# Patient Record
Sex: FEMALE | Race: BLACK OR AFRICAN AMERICAN | NOT HISPANIC OR LATINO | Employment: FULL TIME | RURAL
[De-identification: names, ages, dates, MRNs, and addresses within clinical notes are randomized per-mention and may not be internally consistent; named-entity substitution may affect disease eponyms.]

---

## 2021-02-24 ENCOUNTER — OFFICE VISIT (OUTPATIENT)
Dept: FAMILY MEDICINE CLINIC | Facility: CLINIC | Age: 31
End: 2021-02-24

## 2021-02-24 VITALS
HEIGHT: 69 IN | HEART RATE: 92 BPM | RESPIRATION RATE: 20 BRPM | TEMPERATURE: 97.5 F | WEIGHT: 263.13 LBS | BODY MASS INDEX: 38.97 KG/M2 | OXYGEN SATURATION: 98 % | SYSTOLIC BLOOD PRESSURE: 140 MMHG | DIASTOLIC BLOOD PRESSURE: 94 MMHG

## 2021-02-24 DIAGNOSIS — R03.0 ELEVATED BLOOD PRESSURE READING: ICD-10-CM

## 2021-02-24 DIAGNOSIS — M79.644 FINGER PAIN, RIGHT: Primary | ICD-10-CM

## 2021-02-24 DIAGNOSIS — B07.9 VIRAL WART ON FINGER: ICD-10-CM

## 2021-02-24 PROCEDURE — 17110 DESTRUCTION B9 LES UP TO 14: CPT | Performed by: NURSE PRACTITIONER

## 2021-02-24 PROCEDURE — 99203 OFFICE O/P NEW LOW 30 MIN: CPT | Performed by: NURSE PRACTITIONER

## 2021-02-24 NOTE — PATIENT INSTRUCTIONS
Blood Pressure Record Sheet  To take your blood pressure, you will need a blood pressure machine. You can buy a blood pressure machine (blood pressure monitor) at your clinic, drug store, or online. When choosing one, consider:  · An automatic monitor that has an arm cuff.  · A cuff that wraps snugly around your upper arm. You should be able to fit only one finger between your arm and the cuff.  · A device that stores blood pressure reading results.  · Do not choose a monitor that measures your blood pressure from your wrist or finger.  Follow your health care provider's instructions for how to take your blood pressure. To use this form:  · Get one reading in the morning (a.m.) before you take any medicines.  · Get one reading in the evening (p.m.) before supper.  · Take at least 2 readings with each blood pressure check. This makes sure the results are correct. Wait 1-2 minutes between measurements.  · Write down the results in the spaces on this form.  · Repeat this once a week, or as told by your health care provider.  · Make a follow-up appointment with your health care provider to discuss the results.  Blood pressure log  Date: _______________________  · a.m. _____________________(1st reading) _____________________(2nd reading)  · p.m. _____________________(1st reading) _____________________(2nd reading)  Date: _______________________  · a.m. _____________________(1st reading) _____________________(2nd reading)  · p.m. _____________________(1st reading) _____________________(2nd reading)  Date: _______________________  · a.m. _____________________(1st reading) _____________________(2nd reading)  · p.m. _____________________(1st reading) _____________________(2nd reading)  Date: _______________________  · a.m. _____________________(1st reading) _____________________(2nd reading)  · p.m. _____________________(1st reading) _____________________(2nd reading)  Date: _______________________  · a.m.  _____________________(1st reading) _____________________(2nd reading)  · p.m. _____________________(1st reading) _____________________(2nd reading)  This information is not intended to replace advice given to you by your health care provider. Make sure you discuss any questions you have with your health care provider.  Document Revised: 02/15/2019 Document Reviewed: 12/18/2018  [employer] Patient Education © 2020 [employer]    Warts    Warts are small growths on the skin. They are common, and they are caused by a virus. Warts can be found on many parts of the body. A person may have one wart or many warts. Most warts will go away on their own with time, but this could take many months to a few years. Treatments may be done if needed.  What are the causes?  Warts are caused by a type of virus that is called HPV.  · This virus can spread from person to person through touching.  · Warts can also spread to other parts of the body when a person scratches a wart and then scratches normal skin.  What increases the risk?  You are more likely to get warts if:  · You are 10-20 years old.  · You have a weak body defense system (immune system).  · You are .  What are the signs or symptoms?  The main symptom of this condition is small growths on the skin. Warts may:  · Be round, oval, or have an uneven shape.  · Feel rough to the touch.  · Be the color of your skin or light yellow, brown, or gray.  · Often be less than ½ inch (1.3 cm) in size.  · Go away and then come back again.  Most warts do not hurt, but some can hurt if they are large or if they are on the bottom of your feet.  How is this diagnosed?  A wart can often be diagnosed by how it looks. In some cases, the doctor might remove a little bit of the wart to test it (biopsy).  How is this treated?  Most of the time, warts do not need treatment. Sometimes people want warts removed. If treatment is needed or wanted, options may include:  · Putting creams or  patches with medicine in them on the wart.  · Putting duct tape over the top of the wart.  · Freezing the wart.  · Burning the wart with:  ? A laser.  ? An electric probe.  · Giving a shot of medicine into the wart to help the body's defense system fight off the wart.  · Surgery to remove the wart.  Follow these instructions at home:    Medicines  · Apply over-the-counter and prescription medicines only as told by your doctor.  · Do not apply over-the-counter wart medicines to your face or genitals before you ask your doctor if it is okay to do that.  Lifestyle  · Keep your body's defense system healthy. To do this:  ? Eat a healthy diet.  ? Get enough sleep.  ? Do not use any products that contain nicotine or tobacco, such as cigarettes and e-cigarettes. If you need help quitting, ask your doctor.  General instructions  · Wash your hands after you touch a wart.  · Do not scratch or pick at a wart.  · Avoid shaving hair that is over a wart.  · Keep all follow-up visits as told by your doctor. This is important.  Contact a doctor if:  · Your warts do not get better after treatment.  · You have redness, swelling, or pain at the site of a wart.  · You have bleeding from a wart, and the bleeding does not stop when you put light pressure on the wart.  · You have diabetes and you get a wart.  Summary  · Warts are small growths on the skin. They are common, and they are caused by a virus.  · Most of the time, warts do not need treatment. Sometimes people want warts removed. If treatment is needed or wanted, there are many options.  · Apply over-the-counter and prescription medicines only as told by your doctor.  · Wash your hands after you touch a wart.  · Keep all follow-up visits as told by your doctor. This is important.  This information is not intended to replace advice given to you by your health care provider. Make sure you discuss any questions you have with your health care provider.  Document Revised: 05/07/2019  Document Reviewed: 05/07/2019  [employer] Patient Education © 2020 [employer]  Cryosurgery for Skin Conditions  Cryosurgery is the use of a very cold liquid (liquid nitrogen) to treat abnormal or diseased tissue. This treatment is also called cryotherapy. It can freeze or take away growths on skin, such as:  · Warts.  · Skin sores that could become cancer.  · Some skin cancers.  This treatment normally takes a few minutes. It can be done in your doctor's office.  Tell a doctor about:  · Any allergies you have.  · All medicines you are taking, including vitamins, herbs, eye drops, creams, and over-the-counter medicines.  · Any problems you or family members have had with medicines that make you fall asleep (anesthetic medicines).  · Any blood disorders you have.  · Any surgeries you have had.  · Any medical conditions you have.  · Whether you are pregnant or may be pregnant.  What are the risks?  Generally, this is a safe treatment. However, problems may occur, including:  · Infection.  · Bleeding.  · Scarring.  · Changes in skin color (lighter or darker than normal skin tone).  · Swelling.  · Hair loss in the treated area.  · Damage to nearby parts or organs, such as nerve damage and loss of feeling. This is rare.  What happens before the procedure?  You do not have to do anything to get ready for this treatment. Your doctor will talk with you about:  · The treatment.  · The benefits and risks.  What happens during the procedure?    · Your treatment will be done in one of these two ways:  ? Your doctor may use a tool (probe) on your skin. The tool has very cold liquid in it to cool it down. The tool will be used until the skin is frozen and killed.  ? Your doctor may use a swab or spray to get the very cold liquid onto your skin. Your doctor will keep using the very cold liquid until the skin is frozen and killed.  · A bandage (dressing) may be put on the area.  These procedures may vary among doctors and  clinics.  What can I expect after the treatment?  · After your treatment, it is common to have:  ? Redness over the treated area.  ? Swelling over the treated area.  ? A blister that forms over the treated area. The blister may have a little blood in it.  · You may also have some mild stinging or a burning feeling that will go away.  · If a blister forms, it will break open on its own after about 2-4 weeks. This will leave a scab. Then the treated area will heal. After healing, there is normally little or no scarring.  Follow these instructions at home:  Caring for the treated area    · Follow instructions from your doctor about how to take care of your treated area. If you have a bandage, make sure you:  ? Wash your hands with soap and water for at least 20 seconds before and after you change your bandage. If you cannot use soap and water, use hand .  ? Change your bandage as told by your doctor.  ? Keep the bandage and the treated area clean and dry. If the bandage gets wet, change it right away.  ? Clean the treated area with soap and water.  ? Keep the area covered with a bandage until it heals, or for as long as told by your doctor.  · Check the treated area every day for signs of infection. Check for:  ? More redness, swelling, or pain.  ? More fluid or blood.  ? Warmth.  ? Pus or a bad smell.  · If you have a blister:  ? Do not pick at it. Doing this can cause infection and scarring.  ? Do not try to break it open. Doing this can cause infection and scarring.  · Do not put any medicine, cream, or lotion on the treated area unless told by your doctor.  Bathing  · Until your doctor approves:  ? Do not take baths.  ? Do not swim.  ? Do not use a hot tub.  ? Do not hand-wash dishes.  ? Do not soak the treated area in other ways.  · Ask your doctor if you may take showers. You may only be allowed to take sponge baths.  General instructions  · Take over-the-counter and prescription medicines only as told  by your doctor.  · Do not use any products that contain nicotine or tobacco, such as cigarettes, e-cigarettes, and chewing tobacco. These can delay healing. If you need help quitting, ask your doctor.  · Keep all follow-up visits as told by your doctor. This is important.  Contact a doctor if:  · You have any of these signs of infection in or around your treated area:  ? More redness.  ? More swelling.  ? More pain.  ? More fluid.  ? More blood.  ? Warmth.  ? Pus or a bad smell.  · Your blister grows large and causes pain.  Get help right away if:  · You have a fever.  · You have redness that spreads from the treated area.  Summary  · Cryosurgery uses a very cold liquid to freeze or take away growths on skin. It is also called cryotherapy.  · Generally, this is a safe treatment. You do not have to do anything to get ready for it.  · Your doctor will freeze or kill the skin in one of two ways. One way is with a tool (probe) that has the very cold liquid in it. The other way is to spread the very cold liquid onto your skin with a swab or spray.  · After your treatment, follow care instructions from your doctor. Watch for infection. If you have a blister, do not pick at it or break it open.  This information is not intended to replace advice given to you by your health care provider. Make sure you discuss any questions you have with your health care provider.  Document Revised: 08/05/2020 Document Reviewed: 08/05/2020  [employer] Patient Education © 2020 [employer]

## 2021-02-24 NOTE — PROGRESS NOTES
"Chief Complaint  No chief complaint on file.    Subjective          Parvzi Florez presents to [facility]    PCP: none    CC: \"I have a bump on my right index finger\"    Hand Pain   Incident onset: pain/bump x 2-3 months. Incident location: unknown. There was no injury mechanism (does work with a lot of metal, ? foreign body, but no sure). Pain location: right 2nd finger. Quality: throbbing. The pain does not radiate. The pain is at a severity of 3/10 (if area is bumped, cold air). The pain has been fluctuating since the incident. Pertinent negatives include no chest pain, muscle weakness, numbness or tingling. The symptoms are aggravated by palpation and movement. Treatments tried: seen by provider at employer and they \"picked something out\", recently given antibiotics, but not started. The treatment provided no relief.       Review of Systems   Constitutional: Negative.    Respiratory: Negative.    Cardiovascular: Negative.  Negative for chest pain.   Musculoskeletal: Positive for arthralgias ( finger pain).   Skin:        +lesion, 2nd finger     Neurological: Negative for tingling and numbness.     Objective   Vital Signs:   /94 (BP Location: Right arm, Patient Position: Sitting, Cuff Size: Large Adult)   Pulse 92   Temp 97.5 °F (36.4 °C) (Temporal)   Resp 20   Ht 175.3 cm (69\")   Wt 119 kg (263 lb 2 oz)   SpO2 98%   BMI 38.86 kg/m²     Physical Exam  Vitals signs and nursing note reviewed.   Constitutional:       General: She is not in acute distress.     Appearance: Normal appearance. She is not ill-appearing.   HENT:      Head: Normocephalic and atraumatic.   Neck:      Musculoskeletal: Neck supple.   Cardiovascular:      Rate and Rhythm: Normal rate and regular rhythm.   Pulmonary:      Effort: Pulmonary effort is normal. No respiratory distress.      Breath sounds: Normal breath sounds. No wheezing, rhonchi or rales.   Skin:     " "General: Skin is warm and dry.      Findings: Lesion present.          Neurological:      General: No focal deficit present.      Mental Status: She is alert and oriented to person, place, and time.        Result Review :          Cryotherapy, Skin Lesion    Date/Time: 2/24/2021 10:15 AM  Performed by: David Boykin APRN  Authorized by: David Boykin APRN   Consent: Verbal consent obtained. Written consent not obtained.  Risks and benefits: risks, benefits and alternatives were discussed  Consent given by: patient  Patient understanding: patient states understanding of the procedure being performed  Site marked: the operative site was marked  Imaging studies: imaging studies not available  Required items: required blood products, implants, devices, and special equipment available  Patient identity confirmed: verbally with patient  Time out: Immediately prior to procedure a \"time out\" was called to verify the correct patient, procedure, equipment, support staff and site/side marked as required.  Preparation: Patient was prepped and draped in the usual sterile fashion.  Local anesthesia used: no    Anesthesia:  Local anesthesia used: no    Sedation:  Patient sedated: no    Patient tolerance: patient tolerated the procedure well with no immediate complications  Comments: Cryotherapy with histofreezer x 40 seconds to warty lesion on right 2nd finger performed.  Patient tolerated well.             Assessment and Plan    Diagnoses and all orders for this visit:    1. Finger pain, right (Primary)  -     XR Finger 2+ View Right (In Office)    2. Viral wart on finger  -     Cryotherapy, Skin Lesion    3. Elevated blood pressure reading    Cryotherapy as above.  Did discuss that it may require more than one treatment.  May RTC or see PCP for additional cryotherapy if needed.   Xrays today to r/o foreign body--will call with results.    Tylenol or Motrin as needed for pain  Padding/cushioning over the finger as needed while " working.     B/P elevated in office today.  Denies hx of HTN.  Will need to continue to monitor for now.  Asymptomatic.   Encouraged to set up with PCP--cards provided    RTW: 2- ([employer] form)    Patient's Body mass index is 38.86 kg/m². BMI is above normal parameters. Recommendations include: referral to primary care.    See PCP or RTC if symptoms persist/worsen  See PCP for routine f/u visit and management of chronic medical conditions      This document has been electronically signed by OBDULIO Meneses on February 24, 2021 10:56 CST,.

## 2021-03-18 ENCOUNTER — OFFICE VISIT (OUTPATIENT)
Dept: FAMILY MEDICINE CLINIC | Facility: CLINIC | Age: 31
End: 2021-03-18

## 2021-03-18 VITALS
OXYGEN SATURATION: 100 % | RESPIRATION RATE: 18 BRPM | SYSTOLIC BLOOD PRESSURE: 118 MMHG | BODY MASS INDEX: 39.25 KG/M2 | HEIGHT: 69 IN | HEART RATE: 93 BPM | DIASTOLIC BLOOD PRESSURE: 80 MMHG | WEIGHT: 265 LBS

## 2021-03-18 DIAGNOSIS — B07.9 VIRAL WART ON FINGER: Primary | ICD-10-CM

## 2021-03-18 PROCEDURE — 17110 DESTRUCTION B9 LES UP TO 14: CPT | Performed by: STUDENT IN AN ORGANIZED HEALTH CARE EDUCATION/TRAINING PROGRAM

## 2021-03-18 NOTE — PROGRESS NOTES
"   Subjective:  Parviz Florez is a 30 y.o. female who presents for skin lesion follow-up.    Patient was seen on 2/24/2021, diagnosed with viral wart on her finger, had cryotherapy done.  Reports no adverse effects of procedure, however lesion still present, has not gotten better, no peeling, scaling, erythema, tenderness, swelling.  First noticed approximately 2 months ago, pain is negligible unless hit, 3-10 at its worse.  No fever, chills, joint swelling, weakness, decreased range of motion of finger.      Patient Active Problem List   Diagnosis   • Elevated blood pressure reading   • Viral wart on finger     Vitals:    Vitals:    03/18/21 0953   BP: 118/80   Pulse: 93   Resp: 18   SpO2: 100%   Weight: 120 kg (265 lb)   Height: 175.3 cm (69\")     Body mass index is 39.13 kg/m².    No current outpatient medications on file.    Review of Systems  Review of Systems   Constitutional: Negative for appetite change and fever.   HENT: Negative for sore throat.    Eyes: Negative for discharge and visual disturbance.   Respiratory: Negative for cough and shortness of breath.    Cardiovascular: Negative for chest pain, palpitations and leg swelling.   Gastrointestinal: Negative for abdominal pain, diarrhea and vomiting.   Genitourinary: Negative for dysuria.   Musculoskeletal: Positive for arthralgias.   Skin: Positive for rash.   Neurological: Negative for light-headedness and headaches.   Psychiatric/Behavioral: Negative for agitation.       Patient Active Problem List   Diagnosis   • Elevated blood pressure reading   • Viral wart on finger     History reviewed. No pertinent surgical history.  Social History     Socioeconomic History   • Marital status: Single     Spouse name: Not on file   • Number of children: Not on file   • Years of education: Not on file   • Highest education level: Not on file   Tobacco Use   • Smoking status: Never Smoker   • Smokeless tobacco: Never Used   Substance and Sexual Activity   • " Alcohol use: Never   • Drug use: Never   • Sexual activity: Defer     Family History   Family history unknown: Yes     No results found for any previous visit.      XR Finger 2+ View Right (In Office)  Narrative: Three view right index finger    HISTORY: Pain right index finger. Lesion right index finger.    AP, lateral and oblique views obtained.    COMPARISON: None    FINDINGS:   No fracture or dislocation.  No other osseous or articular abnormality.  Impression: CONCLUSION:  Normal right index finger    [id number]    Electronically signed by:  Alden Cooper MD  2/24/2021 12:24 PM  [facility] Workstation: [id number]    @[employer]    There is no immunization history on file for this patient.    The following portions of the patient's history were reviewed and updated as appropriate: allergies, current medications, past family history, past medical history, past social history, past surgical history and problem list.    Physical Exam  Physical Exam  Constitutional:       Appearance: Normal appearance.   HENT:      Head: Normocephalic and atraumatic.      Right Ear: Tympanic membrane and ear canal normal.      Left Ear: Tympanic membrane and ear canal normal.   Eyes:      General:         Right eye: No discharge.         Left eye: No discharge.      Conjunctiva/sclera: Conjunctivae normal.   Cardiovascular:      Rate and Rhythm: Normal rate and regular rhythm.      Pulses: Normal pulses.      Heart sounds: Normal heart sounds. No murmur heard.     Pulmonary:      Effort: Pulmonary effort is normal. No respiratory distress.      Breath sounds: Normal breath sounds.   Abdominal:      General: There is no distension.      Palpations: Abdomen is soft.      Tenderness: There is no abdominal tenderness.   Musculoskeletal:      Cervical back: Normal range of motion.   Lymphadenopathy:      Cervical: No cervical adenopathy.   Skin:         Neurological:      Mental Status: She is alert. Mental status is at baseline.   Psychiatric:          Mood and Affect: Mood normal.         Behavior: Behavior normal.       Cryotherapy, Skin Lesion    Date/Time: 3/18/2021 11:47 AM  Performed by: Siva Ewing MD  Authorized by: Siva Ewing MD   Consent: Verbal consent obtained.  Risks and benefits: risks, benefits and alternatives were discussed  Consent given by: patient  Patient understanding: patient states understanding of the procedure being performed  Patient consent: the patient's understanding of the procedure matches consent given  Imaging studies: imaging studies available  Patient identity confirmed: verbally with patient  Local anesthesia used: no    Anesthesia:  Local anesthesia used: no    Sedation:  Patient sedated: no    Patient tolerance: patient tolerated the procedure well with no immediate complications  Comments: Cryotherapy with Verruca-Freeze x40 seconds to hyperkeratotic lesion on right second finger performed.  1 minute was allowed to thaw, second application of Verruca-Freeze for 20 seconds was applied.  Patient tolerated well, no complications.            Assessment/Plan    Diagnosis Plan   1. Viral wart on finger  Cryotherapy, Skin Lesion      Orders Placed This Encounter   Procedures   • Cryotherapy, Skin Lesion     This order was created via procedure documentation     Viral wart of right index finger.  Cryotherapy x2 completed as above, patient tolerated well, counseled that may need repeat treatment, patient agreeable to plan.  Consider topical salicylic acid following cryotherapy at follow-up if unsuccessful.      This document has been electronically signed by Siva Ewing MD on March 18, 2021 11:53 CDT

## 2022-01-12 ENCOUNTER — OFFICE VISIT (OUTPATIENT)
Dept: FAMILY MEDICINE CLINIC | Facility: CLINIC | Age: 32
End: 2022-01-12

## 2022-01-12 ENCOUNTER — TELEPHONE (OUTPATIENT)
Dept: FAMILY MEDICINE CLINIC | Facility: CLINIC | Age: 32
End: 2022-01-12

## 2022-01-12 VITALS
HEART RATE: 98 BPM | DIASTOLIC BLOOD PRESSURE: 92 MMHG | BODY MASS INDEX: 39.25 KG/M2 | RESPIRATION RATE: 18 BRPM | WEIGHT: 265 LBS | SYSTOLIC BLOOD PRESSURE: 138 MMHG | HEIGHT: 69 IN | OXYGEN SATURATION: 100 %

## 2022-01-12 DIAGNOSIS — J06.9 UPPER RESPIRATORY TRACT INFECTION, UNSPECIFIED TYPE: Primary | ICD-10-CM

## 2022-01-12 PROCEDURE — 99213 OFFICE O/P EST LOW 20 MIN: CPT | Performed by: NURSE PRACTITIONER

## 2022-01-12 PROCEDURE — 87635 SARS-COV-2 COVID-19 AMP PRB: CPT | Performed by: NURSE PRACTITIONER

## 2022-01-12 NOTE — PROGRESS NOTES
"Chief Complaint  Nasal Congestion    Subjective          Parviz Florez presents to [facility] - [facility]    PCP: none    CC: \"URI symptoms, check for Covid\"    No known exposures.  Has not had Covid or influenza vaccines.  Taking Flonase, loratadine with some improvement.  Missed work today, needing Covid test to return.     Illness  This is a new problem. Episode onset: 1-10-22. The problem occurs daily. The problem has been unchanged. Associated symptoms include congestion, coughing (occ dry ), fatigue and a sore throat ( scratchy, PND). Pertinent negatives include no abdominal pain, anorexia, arthralgias, change in bowel habit, chest pain, chills, diaphoresis, fever, headaches, joint swelling, myalgias, nausea, neck pain, numbness, rash, swollen glands, urinary symptoms, vertigo, visual change, vomiting or weakness. Nothing aggravates the symptoms. She has tried rest (flonase, loratadine) for the symptoms. The treatment provided mild relief.       Review of Systems   Constitutional: Positive for fatigue. Negative for chills, diaphoresis and fever.   HENT: Positive for congestion and sore throat ( scratchy, PND).    Respiratory: Positive for cough (occ dry ).    Cardiovascular: Negative for chest pain.   Gastrointestinal: Negative for abdominal pain, anorexia, change in bowel habit, nausea and vomiting.   Musculoskeletal: Negative for arthralgias, joint swelling, myalgias and neck pain.   Skin: Negative for rash.   Neurological: Negative for vertigo, weakness, numbness and headaches.        Objective   Vital Signs:   /92   Pulse 98   Resp 18   Ht 175.3 cm (69\")   Wt 120 kg (265 lb)   SpO2 100%   BMI 39.13 kg/m²       Physical Exam     Result Review :                 Assessment and Plan    Diagnoses and all orders for this visit:    1. Upper respiratory tract infection, unspecified type (Primary)  -     COVID-19, [facility] " IN-HOUSE, NP SWAB IN TRANSPORT MEDIA 8-10 HR TAT - Swab, Nasopharynx    Stay well hydrated.   Immune boosters encouraged--Vit C, D, zinc  Avoid lying on back.  Get up and move around frequently.   May continue with Flonase, loratadine.  Declines cough med.   Covid PCR pending--quarantine instructions given.     RTW: pending Covid results    See PCP or RTC if symptoms persist/worsen  See PCP for routine f/u visit and management of chronic medical conditions      This document has been electronically signed by OBDULIO Meneses on January 12, 2022 18:01 CST,.

## 2022-01-12 NOTE — PATIENT INSTRUCTIONS
3 Key Steps to Take While Waiting for Your COVID-19 Test Result  To help stop the spread of COVID-19, take these 3 key steps NOW while waiting for your test results:  1. Stay home and monitor your health.  Stay home and monitor your health to help protect your friends, family, and others from possibly getting COVID-19 from you.  Stay home and away from others:  · If possible, stay away from others, especially people who are at higher risk for getting very sick from COVID-19, such as older adults and people with other medical conditions.  · If you have been in contact with someone with COVID-19, stay home and away from others for 14 days after your last contact with that person. Follow the recommendations of your local public health department if you need to quarantine.  · If you have a fever, cough or other symptoms of COVID-19, stay home and away from others (except to get medical care).  Monitor your health:  · Watch for fever, cough, shortness of breath, or other symptoms of COVID-19. Remember, symptoms may appear 2-14 days after exposure to COVID-19 and can include:  ? Fever or chills  ? Cough  ? Shortness of breath or difficulty breathing  ? Tiredness  ? Muscle or body aches  ? Headache  ? New loss of taste or smell  ? Sore throat  ? Congestion or runny nose  ? Nausea or vomiting  ? Diarrhea  2. Think about the people you have recently been around.  If you are diagnosed with COVID-19, a public health worker may call you to check on your health, discuss who you have been around, and ask where you spent time while you may have been able to spread COVID-19 to others. While you wait for your COVID-19 test result, think about everyone you have been around recently. This will be important information to give health workers if your test is positive.   Complete the information on the back of this page to help you remember everyone you have been around.   3. Answer the phone call from the health department.  If a public  health worker calls you, answer the call to help slow the spread of COVID-19 in your community.  · Discussions with health department staff are confidential. This means that your personal and medical information will be kept private and only shared with those who may need to know, like your health care provider.  · Your name will not be shared with those you came in contact with. The health department will only notify people you were in close contact with (within 6 feet for more than 15 minutes) that they might have been exposed to COVID-19.  Think about the people you have recently been around  If you test positive and are diagnosed with COVID-19, someone from the health department may call to check-in on your health, discuss who you have been around, and ask where you spent time while you may have been able to spread COVID-19 to others. This form can help you think about people you have recently been around so you will be ready if a public health worker calls you.  Things to think about. Have you:  · Gone to work or school?  · Gotten together with others (eaten out at a restaurant, gone out for drinks, exercised with others or gone to a gym, had friends or family over to your house, volunteered, gone to a party, pool, or park)?  · Gone to a store in person (e.g., grocery store, mall)?  · Gone to in-person appointments (e.g., salon, mccray, doctor's or dentist's office)?  · Ridden in a car with others (e.g., rideshare) or taken public transportation?  · Been inside a Bahai, [employer]opalian, Hindu or other places of [employer]?  Who lives with you?  · ______________________________________________________________________  · ______________________________________________________________________  · ______________________________________________________________________  · ______________________________________________________________________  Who have you been around (less than 6 feet for a total of 15 minutes or more) in the  last 10 days? (You may have more people to list than the space provided. If so, write on the front of this sheet or a separate piece of paper.)  Name ______________________________________________  · Phone number ____________________________________  · Date you last saw them _____________________________  · Where you last saw them ________________________________________________  Name ______________________________________________  · Phone number ____________________________________  · Date you last saw them _____________________________  · Where you last saw them ________________________________________________  Name ______________________________________________  · Phone number ____________________________________  · Date you last saw them _____________________________  · Where you last saw them ________________________________________________  Name ______________________________________________  · Phone number ____________________________________  · Date you last saw them _____________________________  · Where you last saw them ________________________________________________  Name ______________________________________________  · Phone number ____________________________________  · Date you last saw them _____________________________  · Where you last saw them ________________________________________________  What have you done in the last 10 days with other people?  Activity _____________________________________________  · Location _________________________________________  · Date ____________________________________________  Activity _____________________________________________  · Location _________________________________________  · Date ____________________________________________  Activity _____________________________________________  · Location _________________________________________  · Date ____________________________________________  Activity _____________________________________________  · Location  _________________________________________  · Date ____________________________________________  Activity _____________________________________________  · Location _________________________________________  · Date ____________________________________________  Centers for Disease Control and Prevention  cdc.gov/coronavirus  07/09/2021  This information is not intended to replace advice given to you by your health care provider. Make sure you discuss any questions you have with your health care provider.  Document Revised: 07/14/2021 Document Reviewed: 07/14/2021  [employer] Patient Education © 2021 [employer]

## 2022-01-13 LAB — SARS-COV-2 N GENE RESP QL NAA+PROBE: DETECTED

## 2022-01-14 ENCOUNTER — TELEPHONE (OUTPATIENT)
Dept: FAMILY MEDICINE CLINIC | Facility: CLINIC | Age: 32
End: 2022-01-14